# Patient Record
Sex: FEMALE | Race: WHITE | URBAN - METROPOLITAN AREA
[De-identification: names, ages, dates, MRNs, and addresses within clinical notes are randomized per-mention and may not be internally consistent; named-entity substitution may affect disease eponyms.]

---

## 2023-05-16 ENCOUNTER — OFFICE VISIT (OUTPATIENT)
Dept: PRIMARY CARE | Facility: CLINIC | Age: 63
End: 2023-05-16
Payer: MEDICARE

## 2023-05-16 VITALS
SYSTOLIC BLOOD PRESSURE: 118 MMHG | WEIGHT: 128 LBS | OXYGEN SATURATION: 99 % | HEART RATE: 73 BPM | DIASTOLIC BLOOD PRESSURE: 80 MMHG

## 2023-05-16 DIAGNOSIS — I60.9 SUBARACHNOID HEMORRHAGE (MULTI): Primary | ICD-10-CM

## 2023-05-16 DIAGNOSIS — Z12.31 ENCOUNTER FOR SCREENING MAMMOGRAM FOR MALIGNANT NEOPLASM OF BREAST: ICD-10-CM

## 2023-05-16 PROCEDURE — 99204 OFFICE O/P NEW MOD 45 MIN: CPT | Performed by: STUDENT IN AN ORGANIZED HEALTH CARE EDUCATION/TRAINING PROGRAM

## 2023-05-16 PROCEDURE — 1036F TOBACCO NON-USER: CPT | Performed by: STUDENT IN AN ORGANIZED HEALTH CARE EDUCATION/TRAINING PROGRAM

## 2023-05-16 NOTE — PATIENT INSTRUCTIONS
Thank you for coming today Lidia!    We discussed home therapies today. We also discussed getting a screening mammogram this summer.     Let's follow up in August or September and as needed.    Take care!

## 2023-05-16 NOTE — PROGRESS NOTES
Subjective   Patient ID: Lidia Weston is a 63 y.o. female h/o HTN, HLD, SVT (20 years ago), both parents w/ aneurysms, presented 4/17/23 with WHOL, found to have diffuse cisternal SAH, CTA L acomm & pcomm aneurysm now s/p craniotomy and aneurysm clipping on 4/18 who presents for hospital follow-up.    HPI  Hospitalization Summary:   4/17-5/3  Presented 4/17/23 with worst headache of her life, n/v.  CTH diffuse cisternal SAH, CTA L acomm & pcomm aneurysm.   TTE normal biventricular function, no sig valvular disease.  On 4/18 s/p L OZ craniotomy for clipping of acomm and pcomm aneurysms, LF EVD. Her EVD was removed on 4/26. Monitored in ICU until 5/1. Her staples were removed on 5/2. The patient recovered well after surgery. PT/OT evaluated the patient and recommended home care level of care after discharge.    Discharge medications:   losartan 100 mg oral tablet - 1 tab(s) orally once a day   amLODIPine 5 mg oral tablet - 1 tab(s) orally once a day   FLUoxetine 20 mg oral tablet - 1 tab(s) orally once a day - more for anxiety  Flax Seed Oil oral capsule - 1 cap(s) orally once a day   cyanocobalamin 1000 mcg oral tablet - 1 tab(s) orally once a day   heparin 5000 units/0.5 mL injectable solution - 5000 units SubCutaneous Every 8   Hours until fully ambulatory   levETIRAcetam 500 mg oral tablet - 1 tab(s) orally 2 times a day until follow   up with Dr. Alegria   multivitamin with minerals - 1 tab oral once daily   cyclobenzaprine 5 mg oral tablet - 1 tab(s) orally 3 times a day as needed for muscle spasms -- NOT TAKING   sennosides-docusate 8.6 mg-50 mg oral tablet - 2 tab(s) orally once a day (at bedtime) -- NOT TAKING     Since hospitalization:   -Overall, patient has been doing very well  -Memory still a little off  -Headaches still lingering  -Feels a little loopy  -Going to live at home with sister in [place] for a couple weeks  -Follow up with neurosurgery on 6/1/23- Britni Espinoza    Social history:    -Lives by herself  -Demetra is her sister   -No pets  -  -Former smoker, quit 10 years ago   -Moderate alcohol use -maybe more-- wine and some beer (4 days a week)-- has not drank alcohol since hospital  -Used to drink a lot of coffee      Health maintenance:  -Colonoscopy 2019 normal -- due next year, Dr. Haley Serra   -Due for pneumonia shot  -S/p 2 doses of shingrix   -Mammogram 2018 normal     Family history:  -Mother with cerebral aneurysm   -DM runs in the family  -Colon cancer in both parents    Objective   Visit Vitals  /80   Pulse 73   Wt 58.1 kg (128 lb)   SpO2 99%   Smoking Status Never      Physical Exam  General: Well appearing woman, conversational, in no acute distress, sometimes needs help with answering questions from her sister, though able to answer most questions appropriately   HEENT: EOMI, PERRL, nares patent without congestion, MMM  CV: RRR, no murmurs  Resp: Lungs CTAB, normal work of breathing  GI: Soft, nondistended, nontender, BS+   Ext: No lower ext swelling  Skin: Warm, dry, no rashes, coronal incision healing well without any signs of infection or bleeding   Neuro: Awake, alert, oriented x3, moving all 4 extremities, nonfocal, normal gait, ambulates without assistance  Psych: Appropriate mood and affect      Assessment/Plan   Lidia Weston is a 63 y.o. female h/o HTN, HLD, SVT (20 years ago), both parents w/ aneurysms, presented 4/17/23 with WHOL, found to have diffuse cisternal SAH, CTA L acomm & pcomm aneurysm now s/p craniotomy and aneurysm clipping on 4/18 who presents for hospital follow-up. She is doing remarkably well after her recent neurosurgery. Continues to have some issues with memory and headaches. She will continue to recover at her sister's home. Will make referral for home care for PT, OT, and SLP.     #HTN  losartan 100 mg oral tablet - 1 tab(s) orally once a day   amLODIPine 5 mg oral tablet - 1 tab(s) orally once a day      #Anxiety  FLUoxetine 20 mg oral tablet - 1 tab(s) orally once a day    #B12 deficiency   cyanocobalamin 1000 mcg oral tablet - 1 tab(s) orally once a day     #SAH s/p anuerysm clipping on 4/18/2023  levETIRAcetam 500 mg oral tablet - 1 tab(s) orally 2 times a day until follow up with neurosurg  -PT, SLP, OT    Health maintenance:  -Colonoscopy 2019 normal -- due next year, Dr. Haley Serra   -Due for pneumonia shot  -S/p 2 doses of shingrix   -Mammogram 2018 normal -- ordered mammogram to do sometime this summer    Problem List Items Addressed This Visit    None  Visit Diagnoses       Subarachnoid hemorrhage (CMS/HCC)    -  Primary    Relevant Orders    Referral to Home Care    Encounter for screening mammogram for malignant neoplasm of breast        Relevant Orders    BI mammo bilateral screening tomosynthesis                 Rhonda Poole MD MPH

## 2023-05-17 ENCOUNTER — APPOINTMENT (OUTPATIENT)
Dept: PRIMARY CARE | Facility: CLINIC | Age: 63
End: 2023-05-17
Payer: MEDICARE

## 2023-10-05 ENCOUNTER — APPOINTMENT (OUTPATIENT)
Dept: PRIMARY CARE | Facility: CLINIC | Age: 63
End: 2023-10-05
Payer: MEDICARE

## 2023-10-05 ENCOUNTER — OFFICE VISIT (OUTPATIENT)
Dept: PRIMARY CARE | Facility: CLINIC | Age: 63
End: 2023-10-05
Payer: MEDICARE

## 2023-10-05 VITALS
OXYGEN SATURATION: 97 % | WEIGHT: 126 LBS | BODY MASS INDEX: 21.63 KG/M2 | DIASTOLIC BLOOD PRESSURE: 60 MMHG | HEART RATE: 71 BPM | SYSTOLIC BLOOD PRESSURE: 122 MMHG

## 2023-10-05 DIAGNOSIS — I60.9 SUBARACHNOID HEMORRHAGE (MULTI): Primary | ICD-10-CM

## 2023-10-05 DIAGNOSIS — F41.9 ANXIETY: ICD-10-CM

## 2023-10-05 PROBLEM — I10 ESSENTIAL HYPERTENSION: Status: ACTIVE | Noted: 2021-04-20

## 2023-10-05 PROBLEM — I67.1 CEREBRAL ANEURYSM (HHS-HCC): Status: ACTIVE | Noted: 2023-10-05

## 2023-10-05 PROBLEM — H81.11 BPPV (BENIGN PAROXYSMAL POSITIONAL VERTIGO), RIGHT: Status: ACTIVE | Noted: 2023-10-05

## 2023-10-05 PROCEDURE — 99213 OFFICE O/P EST LOW 20 MIN: CPT | Performed by: STUDENT IN AN ORGANIZED HEALTH CARE EDUCATION/TRAINING PROGRAM

## 2023-10-05 PROCEDURE — 3078F DIAST BP <80 MM HG: CPT | Performed by: STUDENT IN AN ORGANIZED HEALTH CARE EDUCATION/TRAINING PROGRAM

## 2023-10-05 PROCEDURE — 1036F TOBACCO NON-USER: CPT | Performed by: STUDENT IN AN ORGANIZED HEALTH CARE EDUCATION/TRAINING PROGRAM

## 2023-10-05 PROCEDURE — 1159F MED LIST DOCD IN RCRD: CPT | Performed by: STUDENT IN AN ORGANIZED HEALTH CARE EDUCATION/TRAINING PROGRAM

## 2023-10-05 PROCEDURE — 3074F SYST BP LT 130 MM HG: CPT | Performed by: STUDENT IN AN ORGANIZED HEALTH CARE EDUCATION/TRAINING PROGRAM

## 2023-10-05 PROCEDURE — 1126F AMNT PAIN NOTED NONE PRSNT: CPT | Performed by: STUDENT IN AN ORGANIZED HEALTH CARE EDUCATION/TRAINING PROGRAM

## 2023-10-05 PROCEDURE — 1160F RVW MEDS BY RX/DR IN RCRD: CPT | Performed by: STUDENT IN AN ORGANIZED HEALTH CARE EDUCATION/TRAINING PROGRAM

## 2023-10-05 RX ORDER — AMLODIPINE BESYLATE 5 MG/1
5 TABLET ORAL
COMMUNITY
Start: 2023-03-16 | End: 2024-04-30 | Stop reason: SDUPTHER

## 2023-10-05 RX ORDER — FLUOXETINE 20 MG/1
20 TABLET ORAL DAILY
COMMUNITY
End: 2023-10-05 | Stop reason: ALTCHOICE

## 2023-10-05 RX ORDER — FLUOXETINE HYDROCHLORIDE 20 MG/1
20 CAPSULE ORAL DAILY
Qty: 90 CAPSULE | Refills: 3 | Status: SHIPPED | OUTPATIENT
Start: 2023-10-05 | End: 2024-10-04

## 2023-10-05 RX ORDER — FLUOXETINE 10 MG/1
10 CAPSULE ORAL DAILY
Qty: 90 CAPSULE | Refills: 3 | Status: SHIPPED | OUTPATIENT
Start: 2023-10-05 | End: 2024-10-04

## 2023-10-05 RX ORDER — LOSARTAN POTASSIUM 100 MG/1
100 TABLET ORAL DAILY
COMMUNITY
End: 2024-05-01 | Stop reason: SDUPTHER

## 2023-10-05 NOTE — PATIENT INSTRUCTIONS
Thank you for coming today Lidia!    Please schedule your CTA head with and without contrast. You can get this done on the lower level in [address]. The phone number is [phone].    Please get your blood work done within 30 days before your imaging. The lab is on floor 1 in [address] or on floor LL in [address] in this building or you can go to any  lab.    Please increase your fluoxetine to 30mg daily.    I recommend following up with Britni Ely after imaging.    Please get your COVID booster and flu shot. We can do the pneumonia shot later!    Good luck with the move!

## 2023-10-05 NOTE — PROGRESS NOTES
Subjective   Patient ID: Lidia Weston is a 63 y.o. female h/o HTN, HLD, SVT (20 years ago), both parents w/ aneurysms, presented 4/17/23 with WHOL, found to have diffuse cisternal SAH, CTA L acomm & pcomm aneurysm now s/p craniotomy and aneurysm clipping on 4/18 who presents for follow-up.     HPI  Concerns today:  #Headaches  3 headaches a week, tylenol helps   Stressed in the middle of a move   Working    #Neuroimaging  -Patient states that she is due for neurosurgery imaging follow-up    #Anxiety  -Has been on fluoxetine 20mg   -Feeling stressed and anxious  -Interested in increasing dose      Health maintenance:  -Colonoscopy 2019 normal -- due next year, Dr. Haley Serra   -Due for pneumonia shot, flu shot COVID shot  -S/p 2 doses of shingrix  -Mammogram 7/11/23 normal     Family history:  -Mother with cerebral aneurysm   -DM runs in the family  -Colon cancer in both parents    Objective   Visit Vitals  /60   Pulse 71   Wt 57.2 kg (126 lb)   SpO2 97%   BMI 21.63 kg/m²   Smoking Status Never   BSA 1.61 m²      Physical Exam  General: Well appearing woman, conversational, in no acute distress, improved significantly in terms of communication   HEENT: EOMI, PERRL, nares patent without congestion, MMM  CV: RRR, no murmurs  Resp: Lungs CTAB, normal work of breathing  GI: Soft, nondistended, nontender, BS+   Ext: No lower ext swelling  Skin: Warm, dry, no rashes  Neuro: Awake, alert, oriented x3, moving all 4 extremities, nonfocal, normal gait, ambulates without assistance  Psych: Appropriate mood and affect      Assessment/Plan   Lidia Weston is a 63 y.o. female h/o HTN, HLD, SVT (20 years ago), both parents w/ aneurysms, presented 4/17/23 with WHOL, found to have diffuse cisternal SAH, CTA L acomm & pcomm aneurysm now s/p craniotomy and aneurysm clipping on 4/18 who presents for follow-up. She is doing very well overall, closer to baseline. She is feeling quite stressed and anxious due to  work and moving houses. She also is getting headaches 3 times a week that do resolve with tylenol, no red flag symptoms.    Concerns today:  #Headaches  -Continue tylenol PRN  -Discussed red flag symptoms- reasons to go to ED    #Neuroimaging  -Ordered CTA w and wo contrast   -Plan for follow-up with neurosurg after imaging    #Anxiety  -Increase fluoxetine to 30mg daily    Health maintenance:  -Colonoscopy 2019 normal -- due next year, Dr. Haley Serra   -Due for pneumonia shot, flu shot COVID shot-- will get at pharmacy   -S/p 2 doses of shingrix  -Mammogram 7/11/23 normal     #HTN  losartan 100 mg oral tablet - 1 tab(s) orally once a day   amLODIPine 5 mg oral tablet - 1 tab(s) orally once a day     #B12 deficiency   cyanocobalamin 1000 mcg oral tablet - 1 tab(s) orally once a day         Problem List Items Addressed This Visit       Anxiety    Relevant Medications    FLUoxetine (PROzac) 10 mg capsule    FLUoxetine (PROzac) 20 mg capsule     Other Visit Diagnoses       Subarachnoid hemorrhage (CMS/HCC)    -  Primary    Relevant Orders    CT angio head w and wo IV contrast    Creatinine, Serum               Rhonda Poole MD MPH

## 2023-10-28 ENCOUNTER — LAB (OUTPATIENT)
Dept: LAB | Facility: LAB | Age: 63
End: 2023-10-28
Payer: MEDICARE

## 2023-10-28 DIAGNOSIS — I60.9 SUBARACHNOID HEMORRHAGE (MULTI): ICD-10-CM

## 2023-10-28 LAB
CREAT SERPL-MCNC: 0.67 MG/DL (ref 0.5–1.05)
GFR SERPL CREATININE-BSD FRML MDRD: >90 ML/MIN/1.73M*2

## 2023-10-28 PROCEDURE — 36415 COLL VENOUS BLD VENIPUNCTURE: CPT

## 2023-10-28 PROCEDURE — 82565 ASSAY OF CREATININE: CPT

## 2023-11-03 ENCOUNTER — ANCILLARY PROCEDURE (OUTPATIENT)
Dept: RADIOLOGY | Facility: CLINIC | Age: 63
End: 2023-11-03
Payer: MEDICARE

## 2023-11-03 DIAGNOSIS — I60.9 SUBARACHNOID HEMORRHAGE (MULTI): ICD-10-CM

## 2023-11-03 PROCEDURE — 70496 CT ANGIOGRAPHY HEAD: CPT

## 2023-11-03 PROCEDURE — 70496 CT ANGIOGRAPHY HEAD: CPT | Performed by: RADIOLOGY

## 2023-11-03 PROCEDURE — 2550000001 HC RX 255 CONTRASTS: Performed by: STUDENT IN AN ORGANIZED HEALTH CARE EDUCATION/TRAINING PROGRAM

## 2023-11-03 RX ADMIN — IOHEXOL 50 ML: 350 INJECTION, SOLUTION INTRAVENOUS at 11:21

## 2023-11-20 ENCOUNTER — OFFICE VISIT (OUTPATIENT)
Dept: NEUROSURGERY | Facility: HOSPITAL | Age: 63
End: 2023-11-20
Payer: MEDICARE

## 2023-11-20 VITALS
WEIGHT: 136.69 LBS | HEART RATE: 63 BPM | SYSTOLIC BLOOD PRESSURE: 111 MMHG | HEIGHT: 64 IN | DIASTOLIC BLOOD PRESSURE: 68 MMHG | BODY MASS INDEX: 23.34 KG/M2 | RESPIRATION RATE: 19 BRPM

## 2023-11-20 DIAGNOSIS — I67.1 CEREBRAL ANEURYSM (HHS-HCC): Primary | ICD-10-CM

## 2023-11-20 PROCEDURE — 3078F DIAST BP <80 MM HG: CPT | Performed by: NURSE PRACTITIONER

## 2023-11-20 PROCEDURE — 1126F AMNT PAIN NOTED NONE PRSNT: CPT | Performed by: NURSE PRACTITIONER

## 2023-11-20 PROCEDURE — 1159F MED LIST DOCD IN RCRD: CPT | Performed by: NURSE PRACTITIONER

## 2023-11-20 PROCEDURE — 3074F SYST BP LT 130 MM HG: CPT | Performed by: NURSE PRACTITIONER

## 2023-11-20 PROCEDURE — 1160F RVW MEDS BY RX/DR IN RCRD: CPT | Performed by: NURSE PRACTITIONER

## 2023-11-20 PROCEDURE — 99214 OFFICE O/P EST MOD 30 MIN: CPT | Performed by: NURSE PRACTITIONER

## 2023-11-20 PROCEDURE — 1036F TOBACCO NON-USER: CPT | Performed by: NURSE PRACTITIONER

## 2023-11-20 NOTE — PROGRESS NOTES
"Chief Complaint:   FUV       History Of Present Illness:    Lidia Weston is a 63-year-old female with a PMH significant for HTN, HLD, Afib not on OAC, SVT, BPPV, cervical radiculopathy, and anxiety who presented to [facility] with [facility]OL back in April at which time she was found to have cisternal SAH and L acomm and pcomm aneurysm. Patient is now s/p L OZ for clipping of aneurysm on 04/18/23 with Dr. Alegria. Postoperative DSA with no remnant filling. Patient doing well postoperatively but does continue to have chronic headaches up to 3 x per month that are made worse with stress. She did quit her job 3 days ago and has seen some improvement in symptoms. Patient states she had a syncopal event recently when she was out with friends. They were drinking wine and she felt she was dehydrated and became overheated then passed out. CTA was completed and demonstrated no remnant and prior SAH now 3mm improved from 8mm. She reports intermittent blurred vision.          Vital Signs   /68   Pulse 63   Resp 19   Ht 1.626 m (5' 4\")   Wt 62 kg (136 lb 11 oz)   LMP  (LMP Unknown)   BMI 23.46 kg/m²          Physical Exam:  A&Ox3   Fluent speech   EOMI   PELAEZ; strength 5/5; no drift   Gait normal   SILT   Face and shoulder shrug symmetrical       Past Medical History:  See HPI     Past Surgical History:    has a past surgical history that includes CT angio head w and wo IV contrast (4/17/2023); CT angio neck (4/17/2023); and CT angio head w and wo IV contrast (11/3/2023).    Outpatient Medications:  Current Outpatient Medications   Medication Instructions    amLODIPine (NORVASC) 5 mg, oral, Daily RT    FLUoxetine (PROZAC) 10 mg, oral, Daily, Take with 20mg capsule so you are taking 30mg total daily    FLUoxetine (PROZAC) 20 mg, oral, Daily, Take with 10mg capsule so you are taking 30mg total daily    losartan (COZAAR) 100 mg, oral, Daily         Relevant Results:   Imaging Results: CT angio head w and wo IV contrast " 11/03/2023    Narrative  Interpreted By:  Noe Alvarez,  STUDY:  CT ANGIO HEAD W AND WO IV CONTRAST;  11/3/2023 10:53 am    INDICATION:  Signs/Symptoms:Follow up brain aneurysm s/p clip.    COMPARISON:  04/26/2023 head CT.    ACCESSION NUMBER(S):  [id number]    ORDERING CLINICIAN:  LACEY TSE    TECHNIQUE:  Unenhanced CT images of the head were obtained. Subsequently, bolus  iodinated contrast was administered intravenously and axial images of  the head were acquired.  Coronal, sagittal, and 3-D reconstructions  were provided for review.    FINDINGS:  Aneurysm clips produce streak artifact minimally limiting the  examination. On noncontrast head CT, postoperative findings status  post left pterional craniotomy with encephalomalacia of the left  temporal pole and portions of the left basal ganglia with ex vacuo  dilatation of the frontal horn of the left lateral ventricle noted.  Aneurysm clips are noted in the left paraclinoid region and extending  into the expected region of the anterior communicating artery  complex. Previously noted left frontal catheter has been removed. Can  not exclude 3 mm residual extra-axial collection overlying the left  frontal lobe decreasing in volume compared to the prior evaluation  when extra-axial fluid in this region measured up to 8 mm in  thickness.    CT angiographic examination is at least minimally limited due to  venous contamination. Hypoplastic right A1 segment. Distal internal  carotid arteries are patent and otherwise branch appropriately into  the anterior and middle cerebral arteries without hemodynamically  significant stenosis or other abnormality identified. No definite  residual aneurysm was identified in the region of the left  paraclinoid internal carotid artery or anterior communicating artery  complex. Distal vertebral arteries join appropriately to form the  basilar artery with the typical distal branching pattern and with  proximal patency of the posterior  cerebral arteries.    Impression  Postoperative changes status post aneurysm clipping with no clear  residual component of aneurysm identified. Likely residual  extra-axial collection overlying the left cerebral hemisphere  decreasing in thickness with additional postoperative changes as  above.    MACRO:  None    Signed by: Noe Alvarez 11/3/2023 3:26 PM  Dictation workstation:   [id number]      CT angio head w and wo IV contrast     Narrative  Interpreted By:  BRADY HUSSEIN MD  MRN: [id number]  Patient Name: SWATHI SAUNDERS    STUDY:  CT ANGIO HEAD W/O-W INCLUDE POST PROC; CT ANGIO NECK;  4/17/2023 2:56  pm    INDICATION:  sah, htn, Lie Flat: No .    COMPARISON:  Same day noncontrast CT head.    ACCESSION NUMBER(S):  [id number]; [id number]    ORDERING CLINICIAN:  ERIKA PRETTY    TECHNIQUE:  Thereafter 90 mL of Omnipaque 350 was administered intravenously and  axial images of the head and neck were acquired.  Coronal, sagittal,  and 3-D reconstructions were provided for review.    FINDINGS:  Please refer to separately dictated same day noncontrast CT of the  head for further characterization of nonvascular intracranial  findings.    CTA HEAD FINDINGS:    Intracranial carotid arteries demonstrate symmetric opacification  bilaterally without evidence of focal vessel occlusion, although the  right intracranial vertebral artery and the right carotid terminal  are slightly diminutive in appearance compared to the contralateral  left side, likely due to the anterior cerebral arteries being  supplied by the left A1 bilaterally, with aplastic/hypoplastic right  A1.    There is a 3 x 2 x 3 mm (series 501, image 282; series 504, image 88;  series 505, image 114) saccular outpouching present at the  bifurcation of the left A1, suspected to represent a small anterior  communicating artery aneurysm.    More distally the anterior communicating arteries demonstrate  symmetric opacification without focal occlusion.    The  middle cerebral arteries demonstrate symmetric opacification  proximally, without evidence of significant stenosis in the proximal  M1 segments or large vessel occlusion in the more distal cortical  branches.    There is normal anatomic dominant left vertebral artery, without  evidence of focal vessel occlusion within the intracranial vertebral  arteries bilaterally.    The basilar artery does not demonstrate any focal occlusion.    No focal occlusion is identified in the posterior cerebral arteries  proximally, although the examination more distally somewhat degraded  by presence of extensive hyperdense hemorrhage in early venous  filling.    Opacified dural venous sinuses do not demonstrate any evidence of  occlusive thrombus.    CTA NECK FINDINGS:    Examination of the neck is somewhat degraded by beam hardening  artifact from the contrast bolus in the right subclavian vein.    There is a 3 vessel aortic arch without significant stenosis to the  origin of the great vessels.    Common carotid arteries demonstrate symmetric opacification  bilaterally without significant stenosis.    Carotid bifurcations demonstrate symmetric opacification bilaterally  without significant stenosis.    Extracranial internal carotid arteries demonstrate symmetric  opacification bilaterally, without evidence of focal vessel occlusion  or stenosis. No other vascular abnormalities are identified.    External carotid arteries demonstrate symmetric opacification without  significant atherosclerotic changes.    Extracranial vertebral arteries originate from the subclavian  arteries bilaterally and demonstrate symmetric opacification without  focal occlusion.    Prevertebral and paraspinal soft tissues do not demonstrate any acute  abnormality. No enlarged lymphadenopathy is present.    Multilevel degenerative changes are present in the cervical spine,  with mild reversal normal lordotic curvature at C4-C5, and  contributing likely to at  least mild spinal canal narrowing with  slight effacement of ventral cervical spinal cord at C3-C4, C4-C5 and  C5-C6 due to combination of bulging disc, ligamentum flavum  thickening and endplate spurring. No high-grade spinal canal stenosis  is identified however.    Mild-to-moderate neural foraminal stenosis is present at C5-C6 and  C6-C7 due to endplate spurring hypertrophic facet changes.    Impression  1.  A 3 x 2 x 3 mm saccular anterior communicating artery aneurysm is  present. Note anatomic variant dominant supply of the anterior  communicating arteries via the left A1, with aplastic/hypoplastic  right A1.  2. No large vessel occlusion is identified in the intracranial  circulation. No significant stenosis is present in the large arteries  of the neck.  3. Multilevel degenerative changes of the cervical spine, with likely  at least mild spinal canal narrowing present at C3-C4, C4-C5 and  C5-C6 and mild-to-moderate neural foraminal stenosis is present at  C4-C5 and C5-C6 due to combination of endplate spurring, bulging  disc, and hypertrophic uncovertebral/facet joint changes.      Assessment/Plan   The encounter diagnosis was Cerebral aneurysm.  Patient  is a 63-year-old female with a PMH significant for HTN, HLD, Afib not on OAC, SVT, BPPV, cervical radiculopathy, and anxiety who presented to [facility] with WHOL back in April at which time she was found to have cisternal SAH and L acomm and pcomm aneurysm. Patient is now s/p L OZ for clipping of aneurysm on 04/18/23 with Dr. Alegria. Postoperative DSA with no remnant filling. Recent CTA continues to show no remnant and resolving SAH. Discussed with patient she will be due for repeat CTA in 3 years to continue surveillance and the risk for recurrence while < 10% will still need to be monitored. Additionally, she was instructed to call my office if she is to start anticoagulation at which time we would repeat her CTH. In regards to her persistent headaches a  referral was placed to neurology headaches. If she feels her symptoms are improving now having left her job she does not have to make an appointment. Given her blurred vision and she is s/p aneurysmal clipping I did recommend formal ophthalmological evaluation as she has not had one in the last year. Plan discussed with patient who was in agreement. All questions answered.

## 2024-04-30 DIAGNOSIS — I10 ESSENTIAL HYPERTENSION: Primary | ICD-10-CM

## 2024-04-30 RX ORDER — AMLODIPINE BESYLATE 5 MG/1
5 TABLET ORAL
Qty: 90 TABLET | Refills: 0 | Status: SHIPPED | OUTPATIENT
Start: 2024-04-30 | End: 2024-06-04 | Stop reason: SINTOL

## 2024-05-01 DIAGNOSIS — I10 ESSENTIAL HYPERTENSION: Primary | ICD-10-CM

## 2024-05-01 RX ORDER — LOSARTAN POTASSIUM 100 MG/1
100 TABLET ORAL DAILY
Qty: 90 TABLET | Refills: 1 | Status: SHIPPED | OUTPATIENT
Start: 2024-05-01

## 2024-06-04 ENCOUNTER — OFFICE VISIT (OUTPATIENT)
Dept: PRIMARY CARE | Facility: CLINIC | Age: 64
End: 2024-06-04
Payer: MEDICARE

## 2024-06-04 VITALS
HEART RATE: 58 BPM | WEIGHT: 141 LBS | SYSTOLIC BLOOD PRESSURE: 118 MMHG | OXYGEN SATURATION: 99 % | HEIGHT: 64 IN | DIASTOLIC BLOOD PRESSURE: 73 MMHG | BODY MASS INDEX: 24.07 KG/M2

## 2024-06-04 DIAGNOSIS — Z00.00 ANNUAL PHYSICAL EXAM: Primary | ICD-10-CM

## 2024-06-04 DIAGNOSIS — I48.20 CHRONIC ATRIAL FIBRILLATION, UNSPECIFIED (MULTI): ICD-10-CM

## 2024-06-04 DIAGNOSIS — I72.9 ANEURYSM (CMS-HCC): ICD-10-CM

## 2024-06-04 DIAGNOSIS — Z23 NEED FOR PNEUMOCOCCAL VACCINE: ICD-10-CM

## 2024-06-04 DIAGNOSIS — Z78.0 POST-MENOPAUSE: ICD-10-CM

## 2024-06-04 DIAGNOSIS — I47.10 SUPRAVENTRICULAR TACHYCARDIA (CMS-HCC): ICD-10-CM

## 2024-06-04 DIAGNOSIS — E55.9 VITAMIN D DEFICIENCY: ICD-10-CM

## 2024-06-04 DIAGNOSIS — Z12.31 ENCOUNTER FOR SCREENING MAMMOGRAM FOR MALIGNANT NEOPLASM OF BREAST: ICD-10-CM

## 2024-06-04 PROCEDURE — 99213 OFFICE O/P EST LOW 20 MIN: CPT | Performed by: STUDENT IN AN ORGANIZED HEALTH CARE EDUCATION/TRAINING PROGRAM

## 2024-06-04 PROCEDURE — G0439 PPPS, SUBSEQ VISIT: HCPCS | Performed by: STUDENT IN AN ORGANIZED HEALTH CARE EDUCATION/TRAINING PROGRAM

## 2024-06-04 PROCEDURE — 1170F FXNL STATUS ASSESSED: CPT | Performed by: STUDENT IN AN ORGANIZED HEALTH CARE EDUCATION/TRAINING PROGRAM

## 2024-06-04 PROCEDURE — 3074F SYST BP LT 130 MM HG: CPT | Performed by: STUDENT IN AN ORGANIZED HEALTH CARE EDUCATION/TRAINING PROGRAM

## 2024-06-04 PROCEDURE — 3078F DIAST BP <80 MM HG: CPT | Performed by: STUDENT IN AN ORGANIZED HEALTH CARE EDUCATION/TRAINING PROGRAM

## 2024-06-04 PROCEDURE — 1159F MED LIST DOCD IN RCRD: CPT | Performed by: STUDENT IN AN ORGANIZED HEALTH CARE EDUCATION/TRAINING PROGRAM

## 2024-06-04 ASSESSMENT — ENCOUNTER SYMPTOMS
PSYCHIATRIC NEGATIVE: 1
DIARRHEA: 0
CONSTITUTIONAL NEGATIVE: 1
NAUSEA: 0
LOSS OF SENSATION IN FEET: 0
CARDIOVASCULAR NEGATIVE: 1
VOMITING: 0
OCCASIONAL FEELINGS OF UNSTEADINESS: 0
RESPIRATORY NEGATIVE: 1
GASTROINTESTINAL NEGATIVE: 1
MUSCULOSKELETAL NEGATIVE: 1
LIGHT-HEADEDNESS: 1
EYES NEGATIVE: 1
CONSTIPATION: 0
DEPRESSION: 0

## 2024-06-04 ASSESSMENT — ACTIVITIES OF DAILY LIVING (ADL)
BATHING: INDEPENDENT
DRESSING: INDEPENDENT
TAKING_MEDICATION: INDEPENDENT
GROCERY_SHOPPING: INDEPENDENT
DOING_HOUSEWORK: INDEPENDENT
MANAGING_FINANCES: INDEPENDENT

## 2024-06-04 ASSESSMENT — PATIENT HEALTH QUESTIONNAIRE - PHQ9
SUM OF ALL RESPONSES TO PHQ9 QUESTIONS 1 AND 2: 0
2. FEELING DOWN, DEPRESSED OR HOPELESS: NOT AT ALL
1. LITTLE INTEREST OR PLEASURE IN DOING THINGS: NOT AT ALL

## 2024-06-04 NOTE — PROGRESS NOTES
"Subjective   Reason for Visit: Lidia Weston is an 64 y.o. female here for a Medicare Wellness visit.     Reviewed all medications by prescribing practitioner or clinical pharmacist (such as prescriptions, OTCs, herbal therapies and supplements) and documented in the medical record.    HPI  Lidia Weston is a 63 y.o. female h/o HTN, HLD, SVT (20 years ago), both parents w/ aneurysms, diffuse cisternal SAH found to have CTA L acomm & pcomm aneurysm now s/p craniotomy and aneurysm clipping (4/2023) who presents for Medicare Annual Exam. Patient reports that she occasionally feels hazy/lightheadedness with movements and has tried to get up slower to compensate these symptoms. She does not feel they are interfering with her lifestyle but she does notice them. At times of symptoms, she has checked her BP (usually ~110/68). Otherwise, she is doing well and without headaches. She reports her mood has improved on her Prozac 30mg without depression/anxiety interfering with her lifestyle.     Patient Care Team:  Rhonda Poole MD MPH as PCP - General  Marii Whitley MD as PCP - [employer] PCP     Review of Systems   Constitutional: Negative.    HENT: Negative.     Eyes: Negative.    Respiratory: Negative.     Cardiovascular: Negative.    Gastrointestinal: Negative.  Negative for constipation, diarrhea, nausea and vomiting.   Musculoskeletal: Negative.    Skin: Negative.    Neurological:  Positive for light-headedness.   Psychiatric/Behavioral: Negative.       Objective   Vitals:  /73   Pulse 58   Ht 1.626 m (5' 4\")   Wt 64 kg (141 lb)   LMP  (LMP Unknown)   SpO2 99%   BMI 24.20 kg/m²       Physical Exam  Constitutional:       Appearance: Normal appearance.   HENT:      Head: Normocephalic and atraumatic.      Right Ear: External ear normal.      Left Ear: External ear normal.      Nose: Nose normal.      Mouth/Throat:      Mouth: Mucous membranes are moist.   Eyes:      " Extraocular Movements: Extraocular movements intact.      Conjunctiva/sclera: Conjunctivae normal.      Pupils: Pupils are equal, round, and reactive to light.   Cardiovascular:      Rate and Rhythm: Normal rate and regular rhythm.      Pulses: Normal pulses.   Pulmonary:      Effort: Pulmonary effort is normal.   Abdominal:      General: Bowel sounds are normal.      Palpations: Abdomen is soft.   Musculoskeletal:         General: Normal range of motion.      Cervical back: Normal range of motion.      Right lower leg: No edema.      Left lower leg: No edema.   Skin:     General: Skin is warm.      Capillary Refill: Capillary refill takes less than 2 seconds.   Neurological:      General: No focal deficit present.      Mental Status: She is alert and oriented to person, place, and time.   Psychiatric:         Mood and Affect: Mood normal.       Assessment/Plan   Lidia Weston is a 63 y.o. female h/o HTN, HLD, SVT (20 years ago), both parents w/ aneurysms, diffuse cisternal SAH found to have CTA L acomm & pcomm aneurysm now s/p craniotomy and aneurysm clipping (4/2023) who presents for Medicare Annual Exam. Patient HDS, physical exam reassuring. For symptoms of orthostasis, will adjust BP medications. Detailed health maintenance as below.    #Orthostatic Symptoms  -Discontinue Amlodipine (Goal SBP <140 in setting of SAH)    #Health Maintenance  -Medicare questionnaires filled out  -Yearly labs ordered  -Screenings:   :: Mammogram 07/2023: Category: 2 - Benign. Due.   :: Colonoscopy (05/2024):  Normal, Repeat colonoscopy in 5 years for screening purposes.   :: DEXA due  :: Deferred Pneumococcal vaccine, RSV and Dtap today    Follow up in 3 months for BP check and vaccines.     Seen with Dr. Poole.     Problem List Items Addressed This Visit    None

## 2024-06-04 NOTE — PATIENT INSTRUCTIONS
Thank you for coming today Lidia!    Please get your blood work done. The lab is on floor LL in [address].     Please schedule your mammogram and bone density scan. You can get this done on the lower level in [address]. The phone number is [phone].     Please discontinue your amlodipine since your blood pressure is on the lower end.     Let's follow up in 3 months to make sure your blood pressure is doing well. If your systolic BP is over 140 at home, please let me know and we will restart amlodipine. Please feel free to reach out via [employer] sooner if any issues.

## 2024-06-11 ENCOUNTER — CLINICAL SUPPORT (OUTPATIENT)
Dept: PRIMARY CARE | Facility: CLINIC | Age: 64
End: 2024-06-11
Payer: MEDICARE

## 2024-06-11 DIAGNOSIS — Z23 NEED FOR PNEUMOCOCCAL VACCINE: Primary | ICD-10-CM

## 2024-06-11 PROCEDURE — 99211 OFF/OP EST MAY X REQ PHY/QHP: CPT | Performed by: STUDENT IN AN ORGANIZED HEALTH CARE EDUCATION/TRAINING PROGRAM

## 2024-06-11 PROCEDURE — 90677 PCV20 VACCINE IM: CPT | Performed by: STUDENT IN AN ORGANIZED HEALTH CARE EDUCATION/TRAINING PROGRAM

## 2024-06-11 PROCEDURE — G0009 ADMIN PNEUMOCOCCAL VACCINE: HCPCS | Performed by: STUDENT IN AN ORGANIZED HEALTH CARE EDUCATION/TRAINING PROGRAM

## 2024-07-16 ENCOUNTER — HOSPITAL ENCOUNTER (OUTPATIENT)
Dept: RADIOLOGY | Facility: CLINIC | Age: 64
Discharge: HOME | End: 2024-07-16
Payer: MEDICARE

## 2024-07-16 VITALS — BODY MASS INDEX: 24.09 KG/M2 | HEIGHT: 64 IN | WEIGHT: 141.09 LBS

## 2024-07-16 DIAGNOSIS — Z12.31 ENCOUNTER FOR SCREENING MAMMOGRAM FOR MALIGNANT NEOPLASM OF BREAST: ICD-10-CM

## 2024-07-16 PROCEDURE — 77067 SCR MAMMO BI INCL CAD: CPT | Performed by: RADIOLOGY

## 2024-07-16 PROCEDURE — 77063 BREAST TOMOSYNTHESIS BI: CPT | Performed by: RADIOLOGY

## 2024-07-16 PROCEDURE — 77063 BREAST TOMOSYNTHESIS BI: CPT

## 2024-11-19 DIAGNOSIS — I10 ESSENTIAL HYPERTENSION: ICD-10-CM

## 2024-11-19 RX ORDER — LOSARTAN POTASSIUM 100 MG/1
100 TABLET ORAL DAILY
Qty: 90 TABLET | Refills: 1 | Status: SHIPPED | OUTPATIENT
Start: 2024-11-19

## 2024-12-06 ENCOUNTER — LAB (OUTPATIENT)
Dept: LAB | Facility: LAB | Age: 64
End: 2024-12-06
Payer: MEDICARE

## 2024-12-06 DIAGNOSIS — E55.9 VITAMIN D DEFICIENCY: ICD-10-CM

## 2024-12-06 DIAGNOSIS — Z00.00 ANNUAL PHYSICAL EXAM: ICD-10-CM

## 2024-12-06 LAB
25(OH)D3 SERPL-MCNC: 27 NG/ML (ref 30–100)
ALBUMIN SERPL BCP-MCNC: 4.4 G/DL (ref 3.4–5)
ALP SERPL-CCNC: 97 U/L (ref 33–136)
ALT SERPL W P-5'-P-CCNC: 18 U/L (ref 7–45)
ANION GAP SERPL CALC-SCNC: 11 MMOL/L (ref 10–20)
AST SERPL W P-5'-P-CCNC: 17 U/L (ref 9–39)
BILIRUB SERPL-MCNC: 0.9 MG/DL (ref 0–1.2)
BUN SERPL-MCNC: 16 MG/DL (ref 6–23)
CALCIUM SERPL-MCNC: 9.8 MG/DL (ref 8.6–10.6)
CHLORIDE SERPL-SCNC: 104 MMOL/L (ref 98–107)
CHOLEST SERPL-MCNC: 315 MG/DL (ref 0–199)
CHOLESTEROL/HDL RATIO: 3.8
CO2 SERPL-SCNC: 31 MMOL/L (ref 21–32)
CREAT SERPL-MCNC: 0.67 MG/DL (ref 0.5–1.05)
EGFRCR SERPLBLD CKD-EPI 2021: >90 ML/MIN/1.73M*2
ERYTHROCYTE [DISTWIDTH] IN BLOOD BY AUTOMATED COUNT: 12.2 % (ref 11.5–14.5)
EST. AVERAGE GLUCOSE BLD GHB EST-MCNC: 120 MG/DL
GLUCOSE SERPL-MCNC: 96 MG/DL (ref 74–99)
HBA1C MFR BLD: 5.8 %
HCT VFR BLD AUTO: 39.7 % (ref 36–46)
HDLC SERPL-MCNC: 83.5 MG/DL
HGB BLD-MCNC: 13 G/DL (ref 12–16)
LDLC SERPL CALC-MCNC: 219 MG/DL
MCH RBC QN AUTO: 31.5 PG (ref 26–34)
MCHC RBC AUTO-ENTMCNC: 32.7 G/DL (ref 32–36)
MCV RBC AUTO: 96 FL (ref 80–100)
NON HDL CHOLESTEROL: 232 MG/DL (ref 0–149)
NRBC BLD-RTO: 0 /100 WBCS (ref 0–0)
PLATELET # BLD AUTO: 252 X10*3/UL (ref 150–450)
POTASSIUM SERPL-SCNC: 4.3 MMOL/L (ref 3.5–5.3)
PROT SERPL-MCNC: 7.3 G/DL (ref 6.4–8.2)
RBC # BLD AUTO: 4.13 X10*6/UL (ref 4–5.2)
SODIUM SERPL-SCNC: 142 MMOL/L (ref 136–145)
TRIGL SERPL-MCNC: 65 MG/DL (ref 0–149)
TSH SERPL-ACNC: 3.47 MIU/L (ref 0.44–3.98)
VLDL: 13 MG/DL (ref 0–40)
WBC # BLD AUTO: 4 X10*3/UL (ref 4.4–11.3)

## 2024-12-06 PROCEDURE — 80061 LIPID PANEL: CPT

## 2024-12-06 PROCEDURE — 83036 HEMOGLOBIN GLYCOSYLATED A1C: CPT

## 2024-12-06 PROCEDURE — 85027 COMPLETE CBC AUTOMATED: CPT

## 2024-12-06 PROCEDURE — 84443 ASSAY THYROID STIM HORMONE: CPT

## 2024-12-06 PROCEDURE — 82306 VITAMIN D 25 HYDROXY: CPT

## 2024-12-06 PROCEDURE — 36415 COLL VENOUS BLD VENIPUNCTURE: CPT

## 2024-12-06 PROCEDURE — 80053 COMPREHEN METABOLIC PANEL: CPT

## 2025-02-04 ENCOUNTER — APPOINTMENT (OUTPATIENT)
Dept: PRIMARY CARE | Facility: CLINIC | Age: 65
End: 2025-02-04
Payer: MEDICARE

## 2025-02-04 VITALS — SYSTOLIC BLOOD PRESSURE: 130 MMHG | BODY MASS INDEX: 25.56 KG/M2 | WEIGHT: 149 LBS | DIASTOLIC BLOOD PRESSURE: 78 MMHG

## 2025-02-04 DIAGNOSIS — I72.9 ANEURYSM (CMS-HCC): ICD-10-CM

## 2025-02-04 DIAGNOSIS — D22.9 ATYPICAL NEVI: Primary | ICD-10-CM

## 2025-02-04 DIAGNOSIS — I60.9 SAH (SUBARACHNOID HEMORRHAGE) (MULTI): ICD-10-CM

## 2025-02-04 DIAGNOSIS — E78.2 MIXED HYPERLIPIDEMIA: ICD-10-CM

## 2025-02-04 DIAGNOSIS — I10 ESSENTIAL HYPERTENSION: ICD-10-CM

## 2025-02-04 PROCEDURE — 3075F SYST BP GE 130 - 139MM HG: CPT | Performed by: STUDENT IN AN ORGANIZED HEALTH CARE EDUCATION/TRAINING PROGRAM

## 2025-02-04 PROCEDURE — 1159F MED LIST DOCD IN RCRD: CPT | Performed by: STUDENT IN AN ORGANIZED HEALTH CARE EDUCATION/TRAINING PROGRAM

## 2025-02-04 PROCEDURE — G2211 COMPLEX E/M VISIT ADD ON: HCPCS | Performed by: STUDENT IN AN ORGANIZED HEALTH CARE EDUCATION/TRAINING PROGRAM

## 2025-02-04 PROCEDURE — 3078F DIAST BP <80 MM HG: CPT | Performed by: STUDENT IN AN ORGANIZED HEALTH CARE EDUCATION/TRAINING PROGRAM

## 2025-02-04 PROCEDURE — 99214 OFFICE O/P EST MOD 30 MIN: CPT | Performed by: STUDENT IN AN ORGANIZED HEALTH CARE EDUCATION/TRAINING PROGRAM

## 2025-02-04 RX ORDER — LOSARTAN POTASSIUM 100 MG/1
100 TABLET ORAL DAILY
Qty: 90 TABLET | Refills: 3 | Status: SHIPPED | OUTPATIENT
Start: 2025-02-04

## 2025-02-04 NOTE — PATIENT INSTRUCTIONS
Thank you for coming today Lidia!     Please try the nasal spray (steroid like fluticasone, triamcinolone, mometasone) one spray in each nostril twice a day. I also recommend AYR or another nasal saline gel.     Let's repeat your CT angio of your head and neck. For your high cholesterol, let's get a CT of your heart called a CT cardiac score.      I have made a referral to dermatology. I recommend Dr. Asim Blanco and Dr. Liliam Echevarria. Please call [phone] to make an appointment.

## 2025-02-04 NOTE — PROGRESS NOTES
Subjective   Reason for Visit: Lidia Weston is an 64 y.o. 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 She reports experiencing severe sinus headaches that triggered vertigo, which initially lasted a few seconds. Although the spinning sensation has resolved, she continues to feel dizzy. The patient notes that her sinus headaches are recurrent, typically resolving for a day or two before returning. She attributes these symptoms to her family history of sinus problems.    The patient observed that her symptoms improved during a recent trip to [place] but returned upon coming home.    She uses nasal spray intermittently     #Medication changes  The patient reports discontinuing Prozac three months ago after a gradual tapering process. She experienced some expected effects initially but now feels better overall, noting a return of her emotions.      Objective   Vitals:  /78 (BP Location: Right arm, Patient Position: Sitting, BP Cuff Size: Adult)   Wt 67.6 kg (149 lb)   LMP  (LMP Unknown)   BMI 25.56 kg/m²     General: Well appearing, conversational, in no acute distress  HEENT: EOMI, PERRL, nares patent with some congestion, oropharynx with mild cobblestoning, MMM, TMs clear bilaterally   CV: RRR, no murmurs  Resp: Lungs CTAB, normal work of breathing  GI: Soft, nondistended, nontender, BS+   Ext: No lower ext swelling  Skin: Warm, dry, no rashes, many freckles   Neuro: Awake, alert, oriented x3, moving all 4 extremities, nonfocal, normal gait, ambulates without assistance  Psych: Appropriate mood and affect     Assessment/Plan    Lidia Weston is an 64 y.o. female  h/o HTN, HLD, SVT (20 years ago), both  parents w/ aneurysms, diffuse cisternal SAH found to have CTA L acomm & pcomm aneurysm now s/p craniotomy and aneurysm clipping (4/2023) who presents for Follow-up.    #Chronic sinusitis with headaches and vertigo     - Resume Nasonex: 1 spray in each nostril twice daily (morning and night)     - Add nasal saline for moisturizing     - Maintain consistent use for several weeks     - Follow up if symptoms persist     - Consider adding antihistamine (e.g., Zyrtec) if nasal spray regimen ineffective    #Intracranial aneurysm surveillance     - Order CT angiogram of head and neck     - Referral to neurosurgeon for follow-up    #Prediabetes     - Encourage lower carbohydrate intake     - Continue monitoring A1c levels    #Hyperlipidemia     - Order CT Cardiac Score to assess for coronary artery calcification     - Order CT angiogram of head and neck to evaluate carotid arteries     - Discuss statin therapy (e.g., atorvastatin) based on test results    #Dermatological evaluation     - Provide dermatology referral for comprehensive skin evaluation    #Mild leukopenia     - Repeat CBC at next visit    #Eustachian tube dysfunction     - Continue aggressive nasal spray regimen as prescribed for sinusitis    Problem List Items Addressed This Visit       Aneurysm (CMS-HCC)    Relevant Orders    CT angio head and neck w and wo IV contrast    Essential hypertension    Relevant Medications    losartan (Cozaar) 100 mg tablet    SAH (subarachnoid hemorrhage) (Multi)    Relevant Orders    CT angio head and neck w and wo IV contrast     Other Visit Diagnoses       Atypical nevi    -  Primary    Relevant Orders    Referral to Dermatology    Mixed hyperlipidemia        Relevant Orders    CT angio head and neck w and wo IV contrast    CT cardiac scoring wo IV contrast

## 2025-02-26 ENCOUNTER — HOSPITAL ENCOUNTER (OUTPATIENT)
Dept: RADIOLOGY | Facility: HOSPITAL | Age: 65
Discharge: HOME | End: 2025-02-26
Payer: MEDICARE

## 2025-02-26 DIAGNOSIS — I60.9 SAH (SUBARACHNOID HEMORRHAGE) (MULTI): ICD-10-CM

## 2025-02-26 DIAGNOSIS — E78.2 MIXED HYPERLIPIDEMIA: ICD-10-CM

## 2025-02-26 DIAGNOSIS — I72.9 ANEURYSM (CMS-HCC): ICD-10-CM

## 2025-02-26 PROCEDURE — 2550000001 HC RX 255 CONTRASTS: Performed by: STUDENT IN AN ORGANIZED HEALTH CARE EDUCATION/TRAINING PROGRAM

## 2025-02-26 PROCEDURE — 70498 CT ANGIOGRAPHY NECK: CPT

## 2025-02-26 RX ADMIN — IOHEXOL 80 ML: 350 INJECTION, SOLUTION INTRAVENOUS at 13:35

## 2025-03-02 NOTE — RESULT ENCOUNTER NOTE
Jayant Murillo, your CT was overall very reassuring and stable with no new aneurysm. It did show some mucous in the sinuses in your forehead which could be causing headaches and sinus pressure.

## 2025-04-02 ENCOUNTER — APPOINTMENT (OUTPATIENT)
Dept: RADIOLOGY | Facility: CLINIC | Age: 65
End: 2025-04-02
Payer: MEDICARE

## 2025-04-15 ENCOUNTER — HOSPITAL ENCOUNTER (OUTPATIENT)
Dept: RADIOLOGY | Facility: CLINIC | Age: 65
Discharge: HOME | End: 2025-04-15
Payer: MEDICARE

## 2025-04-15 DIAGNOSIS — E78.2 MIXED HYPERLIPIDEMIA: ICD-10-CM

## 2025-04-15 PROCEDURE — 75571 CT HRT W/O DYE W/CA TEST: CPT

## 2025-05-07 DIAGNOSIS — I10 ESSENTIAL HYPERTENSION: ICD-10-CM

## 2025-05-07 RX ORDER — LOSARTAN POTASSIUM 100 MG/1
100 TABLET ORAL DAILY
Qty: 90 TABLET | Refills: 3 | Status: SHIPPED | OUTPATIENT
Start: 2025-05-07

## 2025-05-21 ENCOUNTER — APPOINTMENT (OUTPATIENT)
Dept: RADIOLOGY | Facility: HOSPITAL | Age: 65
End: 2025-05-21
Payer: MEDICARE

## 2025-06-19 ENCOUNTER — TELEPHONE (OUTPATIENT)
Dept: NEUROSURGERY | Facility: HOSPITAL | Age: 65
End: 2025-06-19

## 2025-09-08 ENCOUNTER — APPOINTMENT (OUTPATIENT)
Dept: DERMATOLOGY | Facility: HOSPITAL | Age: 65
End: 2025-09-08
Payer: MEDICARE

## 2026-03-02 ENCOUNTER — APPOINTMENT (OUTPATIENT)
Dept: DERMATOLOGY | Facility: HOSPITAL | Age: 66
End: 2026-03-02
Payer: MEDICARE